# Patient Record
Sex: MALE | Race: OTHER | Employment: OTHER | URBAN - METROPOLITAN AREA
[De-identification: names, ages, dates, MRNs, and addresses within clinical notes are randomized per-mention and may not be internally consistent; named-entity substitution may affect disease eponyms.]

---

## 2020-05-05 ENCOUNTER — HOSPITAL ENCOUNTER (INPATIENT)
Facility: HOSPITAL | Age: 65
LOS: 9 days | Discharge: HOME | DRG: 177 | End: 2020-05-14
Attending: EMERGENCY MEDICINE | Admitting: HOSPITALIST
Payer: MEDICARE

## 2020-05-05 ENCOUNTER — APPOINTMENT (OUTPATIENT)
Dept: GENERAL RADIOLOGY | Facility: HOSPITAL | Age: 65
DRG: 177 | End: 2020-05-05
Attending: EMERGENCY MEDICINE
Payer: MEDICARE

## 2020-05-05 DIAGNOSIS — J96.01 ACUTE RESPIRATORY FAILURE WITH HYPOXIA (HCC): ICD-10-CM

## 2020-05-05 DIAGNOSIS — U07.1 PNEUMONIA DUE TO COVID-19 VIRUS: Primary | ICD-10-CM

## 2020-05-05 DIAGNOSIS — J12.82 PNEUMONIA DUE TO COVID-19 VIRUS: Primary | ICD-10-CM

## 2020-05-05 PROCEDURE — 99223 1ST HOSP IP/OBS HIGH 75: CPT | Performed by: INTERNAL MEDICINE

## 2020-05-05 PROCEDURE — 71045 X-RAY EXAM CHEST 1 VIEW: CPT | Performed by: EMERGENCY MEDICINE

## 2020-05-05 PROCEDURE — 99223 1ST HOSP IP/OBS HIGH 75: CPT | Performed by: HOSPITALIST

## 2020-05-05 RX ORDER — ZINC SULFATE 50(220)MG
220 CAPSULE ORAL DAILY
Status: DISCONTINUED | OUTPATIENT
Start: 2020-05-05 | End: 2020-05-14

## 2020-05-05 RX ORDER — HYDROXYCHLOROQUINE SULFATE 200 MG/1
200 TABLET, FILM COATED ORAL 2 TIMES DAILY
Status: DISCONTINUED | OUTPATIENT
Start: 2020-05-05 | End: 2020-05-06

## 2020-05-05 RX ORDER — DOXYCYCLINE HYCLATE 100 MG/1
100 CAPSULE ORAL EVERY 12 HOURS SCHEDULED
Status: DISCONTINUED | OUTPATIENT
Start: 2020-05-05 | End: 2020-05-06

## 2020-05-05 RX ORDER — SODIUM CHLORIDE 0.9 % (FLUSH) 0.9 %
3 SYRINGE (ML) INJECTION AS NEEDED
Status: DISCONTINUED | OUTPATIENT
Start: 2020-05-05 | End: 2020-05-14

## 2020-05-05 RX ORDER — LEVOFLOXACIN 750 MG/1
750 TABLET ORAL DAILY
Status: ON HOLD | COMMUNITY
End: 2020-05-14

## 2020-05-05 RX ORDER — SODIUM CHLORIDE 9 MG/ML
INJECTION, SOLUTION INTRAVENOUS CONTINUOUS
Status: ACTIVE | OUTPATIENT
Start: 2020-05-05 | End: 2020-05-05

## 2020-05-05 RX ORDER — ASCORBIC ACID 500 MG
500 TABLET ORAL 3 TIMES DAILY
Status: DISCONTINUED | OUTPATIENT
Start: 2020-05-05 | End: 2020-05-14

## 2020-05-05 RX ORDER — FENOFIBRATE 160 MG/1
160 TABLET ORAL DAILY
COMMUNITY

## 2020-05-05 RX ORDER — ACETAMINOPHEN 325 MG/1
650 TABLET ORAL EVERY 6 HOURS PRN
Status: DISCONTINUED | OUTPATIENT
Start: 2020-05-05 | End: 2020-05-14

## 2020-05-05 RX ORDER — HEPARIN SODIUM 5000 [USP'U]/ML
5000 INJECTION, SOLUTION INTRAVENOUS; SUBCUTANEOUS EVERY 12 HOURS SCHEDULED
Status: CANCELLED | OUTPATIENT
Start: 2020-05-05

## 2020-05-05 RX ORDER — ONDANSETRON 2 MG/ML
4 INJECTION INTRAMUSCULAR; INTRAVENOUS EVERY 6 HOURS PRN
Status: DISCONTINUED | OUTPATIENT
Start: 2020-05-05 | End: 2020-05-14

## 2020-05-05 RX ORDER — METHYLPREDNISOLONE SODIUM SUCCINATE 40 MG/ML
40 INJECTION, POWDER, LYOPHILIZED, FOR SOLUTION INTRAMUSCULAR; INTRAVENOUS EVERY 12 HOURS
Status: DISCONTINUED | OUTPATIENT
Start: 2020-05-05 | End: 2020-05-07

## 2020-05-05 RX ORDER — ENOXAPARIN SODIUM 100 MG/ML
40 INJECTION SUBCUTANEOUS EVERY 12 HOURS SCHEDULED
Status: DISCONTINUED | OUTPATIENT
Start: 2020-05-05 | End: 2020-05-09

## 2020-05-05 RX ORDER — ENALAPRIL MALEATE 10 MG/1
10 TABLET ORAL DAILY
COMMUNITY

## 2020-05-05 RX ORDER — DEXTROSE MONOHYDRATE 25 G/50ML
50 INJECTION, SOLUTION INTRAVENOUS
Status: DISCONTINUED | OUTPATIENT
Start: 2020-05-05 | End: 2020-05-14

## 2020-05-05 RX ORDER — ONDANSETRON 2 MG/ML
4 INJECTION INTRAMUSCULAR; INTRAVENOUS ONCE
Status: COMPLETED | OUTPATIENT
Start: 2020-05-05 | End: 2020-05-05

## 2020-05-05 RX ORDER — ACETAMINOPHEN 500 MG
1000 TABLET ORAL ONCE
Status: COMPLETED | OUTPATIENT
Start: 2020-05-05 | End: 2020-05-05

## 2020-05-05 NOTE — ED NOTES
Orders for admission, patient is aware of plan and ready to go upstairs. Any questions, please call ED RN Ck Love  at extension [phone].

## 2020-05-05 NOTE — ED PROVIDER NOTES
Patient Seen in: [facility] Emergency Department      History   Patient presents with:  Nausea/Vomiting/Diarrhea  Cough/URI    Stated Complaint: TL - +C19/Vomiting     HPI    52-year-old male with past medical history significant for diabetes, HIV tenderness  Abdominal: Nontender. Nondistended. Soft. Bowel sounds are normal.   Back:   : Musculoskeletal: Normal range of motion. No deformity. Lymphadenopathy: No sig cervical LAD   Neurological: Awake, alert. Normal reflexes.  No cranial nerve de CBC WITH DIFFERENTIAL WITH PLATELET.   Procedure                               Abnormality         Status                     ---------                               -----------         ------                     CBC W/ DIFFERENTIAL[[id number]]          Abno documenting in patient's chart.   Admission disposition: 5/5/2020  3:35 PM                   Disposition and Plan     Clinical Impression:  Pneumonia due to COVID-19 virus  (primary encounter diagnosis)  Acute respiratory failure with hypoxia ([facility] [address]    Dispo

## 2020-05-05 NOTE — ED NOTES
Presents to ED for c/o worsening dyspnea. States he has been feeling ill for 1 week with headache, vomiting, diarrhea, fever, body aches and chills. Tested + for COVID at [facility] over the weekend.  States he lives by himself and does not work, he has no kno

## 2020-05-05 NOTE — H&P
Orders Placed This Encounter   • Latv[place] ENCEPHALITIS VIRUS VACC INACT IM   • HEP A VACC ADULT, IM   • typhoid (VIVOTIF      STAR)  capsule     Sig: Take 1 capsule by mouth every other day.     Dispense:  4 capsule     Refill:  0   • mefloquine (LARIAM) 250 MG tablet     Sig: Take 1 tablet by mouth every 7 days. Start one week before travel and continue for 4 weeks after     Dispense:  7 tablet     Refill:  0        [facility]    PATIENT'S NAME: Esha Barbara   ATTENDING PHYSICIAN: Letha Patricio MD   PATIENT ACCOUNT#:   [id number]    LOCATION:  [place] [address]  MEDICAL RECORD #:   [id number]       YOB: 1955  ADMISSION DATE:       05/05/202 current illness. He cannot remember if he had any specific sick contacts. Other 12-point review of systems negative. PHYSICAL EXAMINATION:    GENERAL:  Alert. Oriented to time, place, and person. Moderate distress. VITAL SIGNS:  Temperature 102. 4

## 2020-05-05 NOTE — CONSULTS
[facility] HOSP - [facility]    Report of Consultation    Kathy Hernández Patient Status:  Emergency    1955 MRN [id number]   Location [address] Attending Chris Nettles MD   Hosp Day # 0 PCP [address] [facility]     Date of Ad rate 21, height 5' 10\" (1.778 m), weight 170 lb (77.1 kg), SpO2 92 %.     Acutely ill-appearing patient  Patient in acute distress  HEENT : at , nc , anicteric sclera   Neck : supple , no JVD or CYRIL   Chest : bilateral crackles / good air exchange to both Vit c and zinc   ID consult   Lovenox sq for DVT prophylaxis       D/w Dr Robertha Nyhan     Thank you for allowing me to participate in the care of your patient. Jonny Sharpe  5/5/2020

## 2020-05-06 PROCEDURE — 99233 SBSQ HOSP IP/OBS HIGH 50: CPT | Performed by: INTERNAL MEDICINE

## 2020-05-06 PROCEDURE — 99233 SBSQ HOSP IP/OBS HIGH 50: CPT | Performed by: HOSPITALIST

## 2020-05-06 RX ORDER — GUAIFENESIN 100 MG/5ML
200 SOLUTION ORAL EVERY 4 HOURS PRN
Status: DISCONTINUED | OUTPATIENT
Start: 2020-05-06 | End: 2020-05-14

## 2020-05-06 RX ORDER — SULFAMETHOXAZOLE AND TRIMETHOPRIM 800; 160 MG/1; MG/1
1 TABLET ORAL DAILY
Status: DISCONTINUED | OUTPATIENT
Start: 2020-05-07 | End: 2020-05-14

## 2020-05-06 RX ORDER — VANCOMYCIN HYDROCHLORIDE 125 MG/1
125 CAPSULE ORAL DAILY
Status: DISCONTINUED | OUTPATIENT
Start: 2020-05-06 | End: 2020-05-14

## 2020-05-06 NOTE — PLAN OF CARE
Pt states he felt \"horrible\" this morning, having increasing SOB & weakness. O2 increased to 8L throughout day. Pt utilized prone positioning this morning with improvement in O2 sats. O2 sats drop into the 70s when standing at bedside.  No appetite this m Verbalizes/displays adequate comfort level or patient's stated pain goal  Description  INTERVENTIONS:  - Encourage pt to monitor pain and request assistance  - Assess pain using appropriate pain scale  - Administer analgesics based on type and severity of responsible for managing their own health  - Refer to Case Management Department for coordinating discharge planning if the patient needs post-hospital services based on physician/LIP order or complex needs related to functional status, cognitive ability o Respiratory Therapy support as indicated  - Manage/alleviate anxiety  - Monitor for signs/symptoms of CO2 retention  Outcome: Progressing     Problem: GASTROINTESTINAL - ADULT  Goal: Maintains or returns to baseline bowel function  Description  INTERVENTIO intact  Description  INTERVENTIONS  - Assess oral mucosa and hygiene practices  - Implement preventative oral hygiene regimen  - Implement oral medicated treatments as ordered  Outcome: Progressing

## 2020-05-06 NOTE — PROGRESS NOTES
[facility]    Progress Note    Davian Norwood Patient Status:  Inpatient    1955 MRN [id number]   Location [facility] Attending Maryln Denver, [address] Day # 1 PCP JAELYN VALLES       Subjective:   Marcelojayson Norwood is a(n) (PLAQUENIL) tab 200 mg, 200 mg, Oral, BID  Doxycycline Hyclate (VIBRAMYCIN) cap 100 mg, 100 mg, Oral, 2 times per day  Vitamin C tab 500 mg, 500 mg, Oral, TID  zinc sulfate (ZINCATE) cap 220 mg, 220 mg, Oral, Daily  Atazanavir-Cobicistat 300-150 MG TABS 1 Date    HGB 12.0 (L) 05/06/2020    HGB 13.7 05/05/2020      Lab Results   Component Value Date    .0 05/06/2020    .0 05/05/2020       Recent Labs   Lab 05/05/20  1356 05/06/20  0618   * 405*   BUN 29* 36*   CREATSERUM 1.66* 1.29   GFR 25 25 25 35 35 35 35 35 35 35 25 25 25

## 2020-05-06 NOTE — CONSULTS
[place] [facility] - [facility] ID CONSULT NOTE    Lisandro Fam Patient Status:  Inpatient    1955 MRN [id number]   Location [facility] Attending Elissa Fuentes MD   Hosp Day # 0 PCP [address] [facility]       Reason for Consultation:  Se (DEX4) oral liquid 30 g, 30 g, Oral, Q15 Min PRN **OR** Glucose-Vitamin C (DEX-4) chewable tab 8 tablet, 8 tablet, Oral, Q15 Min PRN  •  Insulin Aspart Pen (NOVOLOG) 100 UNIT/ML flexpen 1-11 Units, 1-11 Units, Subcutaneous, TID CC  •  Normal Saline Flush 0 146/64, pulse 111, temperature (!) 101.9 °F (38.8 °C), temperature source Oral, resp. rate 22, height 177.8 cm (5' 10\"), weight 170 lb (77.1 kg), SpO2 91 %. Not examined.     Laboratory Data:  Recent Labs   Lab 05/05/20  1356   RBC 4.60   HGB 13.7   HCT KIEL      PLAN:    -  Start ceftriaxone  -  Continue doxycycline  -  Check BCx  -  Check urine Legionella  -  Continue home HIV meds  -  CD4/CD8 has been ordered although may be unreliable during this acute infection  -  Maintain isolation  -  Follow fever

## 2020-05-06 NOTE — PROGRESS NOTES
[facility]    Progress Note    Dvaian Norwood Patient Status:  Inpatient    1955 MRN [id number]   Location [address] [facility] Attending Maryln Denver, [address] [facility] Day # 1 PCP JAELYN VALLES        Subjective:     Constitutional: close follow up   D/w staff     ID following           Results:     Lab Results   Component Value Date    WBC 8.3 05/06/2020    HGB 12.0 (L) 05/06/2020    HCT 35.0 (L) 05/06/2020    .0 05/06/2020    CREATSERUM 1.29 05/06/2020    BUN 36 (H) 05/06/202

## 2020-05-06 NOTE — PLAN OF CARE
Problem: Patient Centered Care  Goal: Patient preferences are identified and integrated in the patient's plan of care  Description  Interventions:  - What would you like us to know as we care for you?  \"I feel weak\"  - Provide timely, complete, and accu Manage/alleviate anxiety  - Utilize distraction and/or relaxation techniques  - Monitor for opioid side effects  - Notify MD/LIP if interventions unsuccessful or patient reports new pain  - Anticipate increased pain with activity and pre-medicate as approp Monitor vital signs, rhythm, and trends  - Monitor for bleeding, hypotension and signs of decreased cardiac output  - Evaluate effectiveness of vasoactive medications to optimize hemodynamic stability  - Monitor arterial and/or venous puncture sites for bl Obtain nutritional consult as needed  - Establish a toileting routine/schedule  - Consider collaborating with pharmacy to review patient's medication profile  Outcome: Progressing     Problem: GENITOURINARY - ADULT  Goal: Absence of urinary retention  Desc monitor closely.

## 2020-05-06 NOTE — PROGRESS NOTES
[place] [facility] - [facility] ID PROGRESS NOTE    Nikhil Flaherty Patient Status:  Inpatient    1955 MRN [id number]   Location [address] [facility] Attending Yordy Barrera, [address] [facility] Day # 1 PCP JAELYN VALLES     Subjective:  ROS reviewed wi bilateral disease.   Labs with elevated CRP and KIEL.     # Severe COVID-19 pneumonia with hypoxia, bilateral infiltrates               - symptom onset 4/28               - s/p Actemra, steroids 5/5/20               - on Levaquin PTA  # Possible secondary ba

## 2020-05-07 PROCEDURE — 99233 SBSQ HOSP IP/OBS HIGH 50: CPT | Performed by: INTERNAL MEDICINE

## 2020-05-07 PROCEDURE — 99233 SBSQ HOSP IP/OBS HIGH 50: CPT | Performed by: HOSPITALIST

## 2020-05-07 RX ORDER — HYDROCODONE POLISTIREX AND CHLORPHENIRAMINE POLISTIREX 10; 8 MG/5ML; MG/5ML
5 SUSPENSION, EXTENDED RELEASE ORAL 2 TIMES DAILY PRN
Status: DISCONTINUED | OUTPATIENT
Start: 2020-05-07 | End: 2020-05-14

## 2020-05-07 RX ORDER — GUAIFENESIN/DEXTROMETHORPHAN 100-10MG/5
100 SYRUP ORAL EVERY 4 HOURS PRN
Status: DISCONTINUED | OUTPATIENT
Start: 2020-05-07 | End: 2020-05-14

## 2020-05-07 NOTE — PLAN OF CARE
Patient A/Ox4, VSS. Afebrile. Weaned from 8L to 7L via nasal cannula. Saturating in high 80's to low 90's. Prone positioning routinely encouraged. Condom catheter removed midday by patient, requests to keep off.  Voiding in urinal. Saline locked per protoco for opioid side effects  - Notify MD/LIP if interventions unsuccessful or patient reports new pain  - Anticipate increased pain with activity and pre-medicate as appropriate  Outcome: Progressing     Problem: SAFETY ADULT - FALL  Goal: Free from fall injur decreased cardiac output  - Evaluate effectiveness of vasoactive medications to optimize hemodynamic stability  - Monitor arterial and/or venous puncture sites for bleeding and/or hematoma  - Assess quality of pulses, skin color and temperature  - Assess f Consider collaborating with pharmacy to review patient's medication profile  Outcome: Progressing     Problem: GENITOURINARY - ADULT  Goal: Absence of urinary retention  Description  INTERVENTIONS:  - Assess patient’s ability to void and empty bladder  - M

## 2020-05-07 NOTE — PLAN OF CARE
Problem: Patient Centered Care  Goal: Patient preferences are identified and integrated in the patient's plan of care  Description  Interventions:  - What would you like us to know as we care for you?  \"I feel a little better\"  - Provide timely, complet management  - Manage/alleviate anxiety  - Utilize distraction and/or relaxation techniques  - Monitor for opioid side effects  - Notify MD/LIP if interventions unsuccessful or patient reports new pain  - Anticipate increased pain with activity and pre-medi stability  Description  INTERVENTIONS:  - Monitor vital signs, rhythm, and trends  - Monitor for bleeding, hypotension and signs of decreased cardiac output  - Evaluate effectiveness of vasoactive medications to optimize hemodynamic stability  - Monitor ar medications  - Encourage mobilization and activity  - Obtain nutritional consult as needed  - Establish a toileting routine/schedule  - Consider collaborating with pharmacy to review patient's medication profile  Outcome: Progressing     Problem: Sudhakar Earthly

## 2020-05-07 NOTE — PROGRESS NOTES
[facility]    Progress Note    Irving Sullivan Patient Status:  Inpatient    1955 MRN [id number]   Location [facility] Attending Flynn Gazra, [address] Day # 2 PCP JAELYN VALLES       Subjective:   Irving Sullivan is a(n) Flush 0.9 % injection 3 mL, 3 mL, Intravenous, PRN  acetaminophen (TYLENOL) tab 650 mg, 650 mg, Oral, Q6H PRN  ondansetron HCl (ZOFRAN) injection 4 mg, 4 mg, Intravenous, Q6H PRN  Enoxaparin Sodium (LOVENOX) 40 MG/0.4ML injection 40 mg, 40 mg, Subcutaneous 108 108   CO2 21.0 21.0 22.0             Assessment and Plan:         ASSESSMENT:    1.        Acute hypoxemic respiratory failure with Covid 19 pneumonia   - currently on 7 L prone   - s/p actemra   - s/p solumedrol  - cont hydroxychloroquine   - cont ceft

## 2020-05-07 NOTE — PROGRESS NOTES
[facility]    Progress Note    Triston Tom Green Patient Status:  Inpatient    1955 MRN [id number]   Location [address] [place] [address] Attending Eileen Zamarripa, [address] [facility] Day # 2 PCP JAELYN VALLES        Subjective:     Constitutional: secondary infection with mild leukocytosis and elevated procalcitonin  Other HTV meds per ID   O2 therapy  Encourage pt for Therapeutic proning   Supportive care   Vit c and zinc   ID consult   Lovenox sq 40 mg bid  for DVT prophylaxis      Keep close foll

## 2020-05-07 NOTE — DIETARY NOTE
ADULT NUTRITION INITIAL ASSESSMENT    Pt is at moderate nutrition risk. Pt does not meet malnutrition criteria.       RECOMMENDATIONS TO MD:  See Nutrition Intervention     NUTRITION DIAGNOSIS/PROBLEM:  Inadequate oral intake related to physiological cause adequate Kcal/Pro intake along with Oral Nutritional Supplements if needed.     - Coordination of nutrition care: collaboration with other providers  - Discharge and transfer of nutrition care to new setting or provider: Pt from home with family, monitor pl 2 g Intravenous Q24H     LABS: reviewed CRP and Ferritin levels trending down, WBC and D Dimer trending up  Recent Labs     05/05/20  1356 05/06/20  0618 05/07/20  0632   * 405* 225*   BUN 29* 36* 48*   CREATSERUM 1.66* 1.29 1.43*   CA 8.2* 7.8* 8.5

## 2020-05-07 NOTE — PROGRESS NOTES
[place] [facility] - [facility] ID PROGRESS NOTE    Jasedi Winston Patient Status:  Inpatient    1955 MRN [id number]   Location [address] [facility] Attending Agustina Hernández, [address] Day # 2 PCP JAELYN VALLES     Subjective:  ROS reviewed wi Tocilizumab and Solu-Medrol. Chest x-ray bilateral disease.   Labs with elevated CRP and KIEL.     # Severe COVID-19 pneumonia with hypoxia, bilateral infiltrates               - symptom onset 4/28               - s/p Actemra, steroids 5/5/20

## 2020-05-08 ENCOUNTER — APPOINTMENT (OUTPATIENT)
Dept: GENERAL RADIOLOGY | Facility: HOSPITAL | Age: 65
DRG: 177 | End: 2020-05-08
Attending: INTERNAL MEDICINE
Payer: MEDICARE

## 2020-05-08 PROCEDURE — 99233 SBSQ HOSP IP/OBS HIGH 50: CPT | Performed by: INTERNAL MEDICINE

## 2020-05-08 PROCEDURE — 71045 X-RAY EXAM CHEST 1 VIEW: CPT | Performed by: INTERNAL MEDICINE

## 2020-05-08 PROCEDURE — 99233 SBSQ HOSP IP/OBS HIGH 50: CPT | Performed by: HOSPITALIST

## 2020-05-08 RX ORDER — SODIUM CHLORIDE 9 MG/ML
INJECTION, SOLUTION INTRAVENOUS ONCE
Status: DISCONTINUED | OUTPATIENT
Start: 2020-05-08 | End: 2020-05-14

## 2020-05-08 RX ORDER — METHYLPREDNISOLONE SODIUM SUCCINATE 40 MG/ML
20 INJECTION, POWDER, LYOPHILIZED, FOR SOLUTION INTRAMUSCULAR; INTRAVENOUS EVERY 12 HOURS
Status: DISCONTINUED | OUTPATIENT
Start: 2020-05-08 | End: 2020-05-11

## 2020-05-08 NOTE — PLAN OF CARE
Problem: Patient Centered Care  Goal: Patient preferences are identified and integrated in the patient's plan of care  Description  Interventions:  - What would you like us to know as we care for you?   - Provide timely, complete, and accurate informatio anxiety  - Utilize distraction and/or relaxation techniques  - Monitor for opioid side effects  - Notify MD/LIP if interventions unsuccessful or patient reports new pain  - Anticipate increased pain with activity and pre-medicate as appropriate  Outcome: P rhythm, and trends  - Monitor for bleeding, hypotension and signs of decreased cardiac output  - Evaluate effectiveness of vasoactive medications to optimize hemodynamic stability  - Monitor arterial and/or venous puncture sites for bleeding and/or hematom consult as needed  - Establish a toileting routine/schedule  - Consider collaborating with pharmacy to review patient's medication profile  Outcome: Progressing     Problem: GENITOURINARY - ADULT  Goal: Absence of urinary retention  Description  INTERVENTI bathroom with standby assist, call precautions. IV abx maintained. Able to tolerate periods of lying prone, encouraged patient to continue.

## 2020-05-08 NOTE — PROGRESS NOTES
[facility]    Progress Note    Debi Collet Patient Status:  Inpatient    1955 MRN [id number]   Location [address] [facility] Attending Jeremiah Cantor, [address] [facility] Day # 3 PCP JAELYN VALLES        Subjective:     Constitutional: meds per ID   O2 therapy  Encourage pt for Therapeutic proning   Supportive care   Vit c and zinc   D/w ID   Lovenox sq 40 mg bid  for high DVT prophylaxis   Potential candidate for plasma therapy        D/w ID   D/w staff            Results:     Lab Resul

## 2020-05-08 NOTE — PROGRESS NOTES
[place] [facility] - [facility] ID PROGRESS NOTE    Veleta Ada Patient Status:  Inpatient    1955 MRN [id number]   Location [address] [facility] Attending Joni Meehan, [address] [facility] Day # 3 PCP JAELYN VALLES     Subjective:  ROS reviewed wi Tocilizumab and Solu-Medrol. Chest x-ray bilateral disease.   Labs with elevated CRP and KIEL.     # Severe COVID-19 pneumonia with hypoxia, bilateral infiltrates               - symptom onset 4/28               - s/p Actemra, steroids 5/5/20

## 2020-05-08 NOTE — PLAN OF CARE
Problem: Patient Centered Care  Goal: Patient preferences are identified and integrated in the patient's plan of care  Description  Interventions:  - What would you like us to know as we care for you?  I WOULD LIKE TO GO HOME  - Provide timely, complete, Manage/alleviate anxiety  - Utilize distraction and/or relaxation techniques  - Monitor for opioid side effects  - Notify MD/LIP if interventions unsuccessful or patient reports new pain  - Anticipate increased pain with activity and pre-medicate as approp Monitor vital signs, rhythm, and trends  - Monitor for bleeding, hypotension and signs of decreased cardiac output  - Evaluate effectiveness of vasoactive medications to optimize hemodynamic stability  - Monitor arterial and/or venous puncture sites for bl Obtain nutritional consult as needed  - Establish a toileting routine/schedule  - Consider collaborating with pharmacy to review patient's medication profile  Outcome: Progressing     Problem: GENITOURINARY - ADULT  Goal: Absence of urinary retention  Desc BETWEEN 79-82% AND O2 WAS INCREASED. PATIENT WAS RAISED TO 15L/HFNC. PATIENT ASYMPTOMATIC. PATIENT WAS ASSISTED TO LAY PRONE AND DO DEEP BREATHING EXERCISES AND SATURATIONS WERE COMING BACK INTO NORMAL LIMITS.  SLOW PATIENT WAS WEANED DOWN FROM 15L TO 10 WI

## 2020-05-08 NOTE — PROGRESS NOTES
[facility]    Progress Note    Marybel Holden Patient Status:  Inpatient    1955 MRN [id number]   Location [facility] Attending Kevin Mayes, [address] [facility] Day # 3 PCP JAELYN VALLES       Subjective:   Marybel Holden is a(n) 30 g, 30 g, Oral, Q15 Min PRN    Or  Glucose-Vitamin C (DEX-4) chewable tab 8 tablet, 8 tablet, Oral, Q15 Min PRN  Normal Saline Flush 0.9 % injection 3 mL, 3 mL, Intravenous, PRN  acetaminophen (TYLENOL) tab 650 mg, 650 mg, Oral, Q6H PRN  ondansetron HCl (H) 05/07/2020    WBC 8.3 05/06/2020     Lab Results   Component Value Date    HGB 11.6 (L) 05/08/2020    HGB 12.1 (L) 05/07/2020    HGB 12.0 (L) 05/06/2020      Lab Results   Component Value Date    .0 05/08/2020    .0 05/07/2020    .

## 2020-05-09 PROCEDURE — 99232 SBSQ HOSP IP/OBS MODERATE 35: CPT | Performed by: INTERNAL MEDICINE

## 2020-05-09 PROCEDURE — 99232 SBSQ HOSP IP/OBS MODERATE 35: CPT | Performed by: HOSPITALIST

## 2020-05-09 RX ORDER — SODIUM CHLORIDE/ALOE VERA
GEL (GRAM) NASAL AS NEEDED
Status: DISCONTINUED | OUTPATIENT
Start: 2020-05-09 | End: 2020-05-14

## 2020-05-09 RX ORDER — ECHINACEA PURPUREA EXTRACT 125 MG
1 TABLET ORAL
Status: DISCONTINUED | OUTPATIENT
Start: 2020-05-09 | End: 2020-05-14

## 2020-05-09 RX ORDER — HYDRALAZINE HYDROCHLORIDE 20 MG/ML
10 INJECTION INTRAMUSCULAR; INTRAVENOUS EVERY 4 HOURS PRN
Status: DISCONTINUED | OUTPATIENT
Start: 2020-05-09 | End: 2020-05-14

## 2020-05-09 NOTE — PLAN OF CARE
Blood pressure elevated. PRN hydralazine administered. Denies pain. Pt has shortness of breath with exertion. Up to bathroom with standby assist. Epistaxis this afternoon; switched to non-rebreather. Weaning O2 as tolerated. Encouraged prone position.     P assistance  - Assess pain using appropriate pain scale  - Administer analgesics based on type and severity of pain and evaluate response  - Implement non-pharmacological measures as appropriate and evaluate response  - Consider cultural and social influenc services based on physician/LIP order or complex needs related to functional status, cognitive ability or social support system  Outcome: Progressing     Problem: CARDIOVASCULAR - ADULT  Goal: Maintains optimal cardiac output and hemodynamic stability  Ashish GASTROINTESTINAL - ADULT  Goal: Maintains or returns to baseline bowel function  Description  INTERVENTIONS:  - Assess bowel function  - Maintain adequate hydration with IV or PO as ordered and tolerated  - Evaluate effectiveness of GI medications  - Encou medicated treatments as ordered  Outcome: Progressing

## 2020-05-09 NOTE — PLAN OF CARE
Problem: Patient Centered Care  Goal: Patient preferences are identified and integrated in the patient's plan of care  Description  Interventions:  - What would you like us to know as we care for you? From home. Home medications are in the cabinet.     - pain and pain management  - Manage/alleviate anxiety  - Utilize distraction and/or relaxation techniques  - Monitor for opioid side effects  - Notify MD/LIP if interventions unsuccessful or patient reports new pain  - Anticipate increased pain with activit care  - Consider collaborating with pharmacy to review patient's medication profile  - Implement strategies to promote bladder emptying  Outcome: Progressing     Problem: SKIN/TISSUE INTEGRITY - ADULT  Goal: Skin integrity remains intact  Description  INTE

## 2020-05-09 NOTE — PROGRESS NOTES
[facility]     Progress Note        Pilo Figueroa Patient Status:  Inpatient    1955 MRN [id number]   Location [address] [facility] Attending Sumi Perez MD   Hosp Day # 4 PCP MARK Debbie Heimlich       Subjective:   Patient seen and ex liquid 30 g, 30 g, Oral, Q15 Min PRN    Or  Glucose-Vitamin C (DEX-4) chewable tab 8 tablet, 8 tablet, Oral, Q15 Min PRN  Normal Saline Flush 0.9 % injection 3 mL, 3 mL, Intravenous, PRN  acetaminophen (TYLENOL) tab 650 mg, 650 mg, Oral, Q6H PRN  ondansetr 5/08/2020 at 8:28 AM                  Assessment   1. COVID-19  2. Acute hypoxemic respiratory failure  3. HIV  4. Diabetes mellitus       Plan   -Patient presents with evidence of acute hypoxemic respiratory failure secondary to COVID-19 infection.   -

## 2020-05-09 NOTE — PROGRESS NOTES
[place] [facility]    Veleta Ada Patient Status:  Inpatient    1955 MRN [id number]   Location [address] [place] [address] Attending Joni Meehan, [address] [facility] Day # 4 PCP JAELYN VALLES     Subjective:  ROS reviewed wi shortness of breath as well as hypoxia. Placed on 4 L nasal cannula. Fevers up to 102.7. WBC 11.8. Procalcitonin 0.7. UA without pyuria. Lactic acid normal.  Started on ceftriaxone and given Tocilizumab and Solu-Medrol.   Chest x-ray bilateral disease

## 2020-05-09 NOTE — PROGRESS NOTES
[place] [facility] - [facility]  Hospitalist Progress Note     Debi Collet Patient Status:  Inpatient    1955  62year old CSN [id number]   Location 510/510-A Attending Shae Marcos, [address] [facility] Se Day # 4 PCP JAELYN Greenberg Seen     ASSESSMENT/PLAN    Acute CO --   --   --   --    TP 7.9  --   --   --   --     < > = values in this interval not displayed. No results for input(s): PT, INR, PTT in the last 168 hours.     • sodium chloride   Intravenous Once   • MethylPREDNISolone Sodium Succ  20 mg Intravenous Q

## 2020-05-10 PROCEDURE — 99232 SBSQ HOSP IP/OBS MODERATE 35: CPT | Performed by: HOSPITALIST

## 2020-05-10 PROCEDURE — 99232 SBSQ HOSP IP/OBS MODERATE 35: CPT | Performed by: INTERNAL MEDICINE

## 2020-05-10 NOTE — PROGRESS NOTES
[place] [facility] - [facility]  Hospitalist Progress Note     Ariel Barboza Patient Status:  Inpatient    1955  62year old CSN [id number]   Location 510/510-A Attending Ry Magaña,  [facility] Se Day # 5 PCP MARK Angelene Blizzard     ASSESSMENT/PLAN    Acute CO --   --   --    AST 33  --   --   --   --    ALT 16  --   --   --   --    BILT 1.4  --   --   --   --    TP 7.9  --   --   --   --     < > = values in this interval not displayed. No results for input(s): PT, INR, PTT in the last 168 hours.     • sodium

## 2020-05-10 NOTE — PLAN OF CARE
Robitussin administered for cough. Nasal gel and spray ordered for nasal dryness. Weaned off non-rebreather mask overnight and currently remains on 8L HFNC with saturations ranging 88-91%. Con't IV solumedrol and IV rocephin.  Plan to wean off oxygen as mitesh to monitor pain and request assistance  - Assess pain using appropriate pain scale  - Administer analgesics based on type and severity of pain and evaluate response  - Implement non-pharmacological measures as appropriate and evaluate response  - Consider patient needs post-hospital services based on physician/LIP order or complex needs related to functional status, cognitive ability or social support system  Outcome: Progressing     Problem: CARDIOVASCULAR - ADULT  Goal: Maintains optimal cardiac output an Progressing     Problem: GASTROINTESTINAL - ADULT  Goal: Maintains or returns to baseline bowel function  Description  INTERVENTIONS:  - Assess bowel function  - Maintain adequate hydration with IV or PO as ordered and tolerated  - Evaluate effectiveness o regimen  - Implement oral medicated treatments as ordered  Outcome: Progressing

## 2020-05-10 NOTE — PROGRESS NOTES
[facility]     Progress Note        Debi Collet Patient Status:  Inpatient    1955 MRN [id number]   Location [address] [facility] within [facility] Attending Shae Marcos MD   Hosp Day # 5 PCP JAELYN Greenberg Seen       Subjective:   Patient seen and ex tablet, 4 tablet, Oral, Q15 Min PRN    Or  dextrose 50 % injection 50 mL, 50 mL, Intravenous, Q15 Min PRN    Or  glucose (DEX4) oral liquid 30 g, 30 g, Oral, Q15 Min PRN    Or  Glucose-Vitamin C (DEX-4) chewable tab 8 tablet, 8 tablet, Oral, Q15 Min PRN  N Clinic

## 2020-05-10 NOTE — PLAN OF CARE
Vitals stable on 5L HFNC. Pt received convalescent plasma this morning. Denies pain. Appetite improving. Activity tolerance improving.    Problem: Patient Centered Care  Goal: Patient preferences are identified and integrated in the patient's plan of care response  - Implement non-pharmacological measures as appropriate and evaluate response  - Consider cultural and social influences on pain and pain management  - Manage/alleviate anxiety  - Utilize distraction and/or relaxation techniques  - Monitor for op system  Outcome: Progressing     Problem: CARDIOVASCULAR - ADULT  Goal: Maintains optimal cardiac output and hemodynamic stability  Description  INTERVENTIONS:  - Monitor vital signs, rhythm, and trends  - Monitor for bleeding, hypotension and signs of dec function  - Maintain adequate hydration with IV or PO as ordered and tolerated  - Evaluate effectiveness of GI medications  - Encourage mobilization and activity  - Obtain nutritional consult as needed  - Establish a toileting routine/schedule  - Consider

## 2020-05-11 PROCEDURE — 99232 SBSQ HOSP IP/OBS MODERATE 35: CPT | Performed by: INTERNAL MEDICINE

## 2020-05-11 PROCEDURE — 99232 SBSQ HOSP IP/OBS MODERATE 35: CPT | Performed by: HOSPITALIST

## 2020-05-11 RX ORDER — ENOXAPARIN SODIUM 100 MG/ML
40 INJECTION SUBCUTANEOUS EVERY 12 HOURS SCHEDULED
Status: DISCONTINUED | OUTPATIENT
Start: 2020-05-12 | End: 2020-05-14

## 2020-05-11 RX ORDER — ENOXAPARIN SODIUM 100 MG/ML
40 INJECTION SUBCUTANEOUS DAILY
Status: DISCONTINUED | OUTPATIENT
Start: 2020-05-11 | End: 2020-05-11

## 2020-05-11 RX ORDER — METHYLPREDNISOLONE SODIUM SUCCINATE 40 MG/ML
20 INJECTION, POWDER, LYOPHILIZED, FOR SOLUTION INTRAMUSCULAR; INTRAVENOUS DAILY
Status: DISCONTINUED | OUTPATIENT
Start: 2020-05-12 | End: 2020-05-12

## 2020-05-11 NOTE — PROGRESS NOTES
[facility]    Progress Note    Jalen Munoz Patient Status:  Inpatient    1955 MRN [id number]   Location 12[address] [facility] Attending Mame Rosario MD   Hosp Day # 6 PCP JAELYN VALLES        Subjective:     Constitutional: Neg Component Value Date    WBC 10.6 05/09/2020    HGB 11.7 (L) 05/09/2020    HCT 34.0 (L) 05/09/2020    .0 05/09/2020    CREATSERUM 1.02 05/09/2020    BUN 29 (H) 05/09/2020     05/09/2020    K 4.8 05/09/2020     05/09/2020    CO2 25.0 05/

## 2020-05-11 NOTE — PLAN OF CARE
A/O, FEELING BETTER, CONTINUE 3 L NC, WILL CONTINUE TO MONITOR  Problem: Patient Centered Care  Goal: Patient preferences are identified and integrated in the patient's plan of care  Description  Interventions:  - What would you like us to know as we care evaluate response  - Consider cultural and social influences on pain and pain management  - Manage/alleviate anxiety  - Utilize distraction and/or relaxation techniques  - Monitor for opioid side effects  - Notify MD/LIP if interventions unsuccessful or pa Maintains optimal cardiac output and hemodynamic stability  Description  INTERVENTIONS:  - Monitor vital signs, rhythm, and trends  - Monitor for bleeding, hypotension and signs of decreased cardiac output  - Evaluate effectiveness of vasoactive medication tolerated  - Evaluate effectiveness of GI medications  - Encourage mobilization and activity  - Obtain nutritional consult as needed  - Establish a toileting routine/schedule  - Consider collaborating with pharmacy to review patient's medication profile  O

## 2020-05-11 NOTE — CM/SW NOTE
MDO for dc planning. PT/OT eval pending. Currently on 3 LPM high flow 02. Lives at home alone. Will await therapy rec to determine dc needs. / to remain available for support and/or discharge planning.      Babak Ortega,

## 2020-05-11 NOTE — PROGRESS NOTES
[place] [facility]    Ai Mejia Patient Status:  Inpatient    1955 MRN [id number]   Location 12[address] [facility] Attending Mayelin Glasgow, [address] [facility] Day # 6 PCP JAELYN VALLES     Subjective:  ROS reviewed wi tachypnea and shortness of breath as well as hypoxia. Placed on 4 L nasal cannula. Fevers up to 102.7. WBC 11.8. Procalcitonin 0.7. UA without pyuria. Lactic acid normal.  Started on ceftriaxone and given Tocilizumab and Solu-Medrol.   Chest x-ray goldy

## 2020-05-11 NOTE — PLAN OF CARE
Nasal spray and gel administered for nasal dryness. Weaned down to 4L HFNC with oxygen saturations > 90%. Con't IV solumedrol and IV rocephin. Plan to wean off oxygen as tolerated and to be discharged home when medically cleared.      Problem: Patient Hector using appropriate pain scale  - Administer analgesics based on type and severity of pain and evaluate response  - Implement non-pharmacological measures as appropriate and evaluate response  - Consider cultural and social influences on pain and pain manage physician/LIP order or complex needs related to functional status, cognitive ability or social support system  Outcome: Progressing     Problem: CARDIOVASCULAR - ADULT  Goal: Maintains optimal cardiac output and hemodynamic stability  Description  INTERVEN ADULT  Goal: Maintains or returns to baseline bowel function  Description  INTERVENTIONS:  - Assess bowel function  - Maintain adequate hydration with IV or PO as ordered and tolerated  - Evaluate effectiveness of GI medications  - Encourage mobilization a ordered  Outcome: Progressing

## 2020-05-11 NOTE — PROGRESS NOTES
[place] [facility] - [facility]  Hospitalist Progress Note     Janellalfredo Williamson Patient Status:  Inpatient    1955  62year old CSN [id number]   Location 510/510-A Attending Patricio Arriaga,  [facility] Se Day # 6 PCP JAELYN VALLES     ASSESSMENT/PLAN    Acute CO hours.    • sodium chloride   Intravenous Once   • MethylPREDNISolone Sodium Succ  20 mg Intravenous Q12H   • Insulin Aspart Pen  5 Units Subcutaneous TID CC   • vancomycin HCl  125 mg Oral Daily   • Insulin Aspart Pen  1-11 Units Subcutaneous TID CC and H

## 2020-05-12 PROCEDURE — 99232 SBSQ HOSP IP/OBS MODERATE 35: CPT | Performed by: INTERNAL MEDICINE

## 2020-05-12 PROCEDURE — 99232 SBSQ HOSP IP/OBS MODERATE 35: CPT | Performed by: HOSPITALIST

## 2020-05-12 NOTE — OCCUPATIONAL THERAPY NOTE
OCCUPATIONAL THERAPY EVALUATION - INPATIENT     Room Number: 510/510-A  Evaluation Date: 5/12/2020  Type of Evaluation: Initial  Presenting Problem: +COVID-19    Physician Order: IP Consult to Occupational Therapy  Reason for Therapy: ADL/IADL Dysfunctio will progress towards IP therapy goals and demo the skills required to return home with intermittent family assist -especially for IADLs (meal prep, cleaning).      The patient's Approx Degree of Impairment: 32.79% has been calculated based on documentation patient currently need…  -   Putting on and taking off regular lower body clothing?: A Little  -   Bathing (including washing, rinsing, drying)?: A Little  -   Toileting, which includes using toilet, bedpan or urinal? : A Little  -   Putting on and taking

## 2020-05-12 NOTE — PLAN OF CARE
Problem: Patient Centered Care  Goal: Patient preferences are identified and integrated in the patient's plan of care  Description  Interventions:  - What would you like us to know as we care for you? From home. Home medications are in the cabinet.     - pain and pain management  - Manage/alleviate anxiety  - Utilize distraction and/or relaxation techniques  - Monitor for opioid side effects  - Notify MD/LIP if interventions unsuccessful or patient reports new pain  - Anticipate increased pain with activit stability  Description  INTERVENTIONS:  - Monitor vital signs, rhythm, and trends  - Monitor for bleeding, hypotension and signs of decreased cardiac output  - Evaluate effectiveness of vasoactive medications to optimize hemodynamic stability  - Monitor ar medications  - Encourage mobilization and activity  - Obtain nutritional consult as needed  - Establish a toileting routine/schedule  - Consider collaborating with pharmacy to review patient's medication profile  Outcome: Progressing     Problem: Eligio Moctezuma

## 2020-05-12 NOTE — PHYSICAL THERAPY NOTE
PHYSICAL THERAPY EVALUATION - INPATIENT     Room Number: 510/510-A  Evaluation Date: 5/12/2020  Type of Evaluation: Initial   Physician Order: PT Eval and Treat    Presenting Problem: +COVID-19  Reason for Therapy: Mobility Dysfunction and Discharge Plann shortness of breath. CBC showed white blood cell count of 11.8 with left shift. Chemistry showed GFR of 43, BUN of 29, creatinine 1.66, glucose 218, . C-reactive protein is 13. D-dimer 1.85. Chest x-ray showed extensive bilateral infiltrates. standing up from a chair with arms (e.g., wheelchair, bedside commode, etc.): A Little   -   Moving from lying on back to sitting on the side of the bed?: A Little   How much help from another person does the patient currently need. ..   -   Moving to and f

## 2020-05-12 NOTE — PROGRESS NOTES
[place] [facility] - [facility]  Hospitalist Progress Note     Piper Perez Patient Status:  Inpatient    1955  62year old CSN [id number]   Location 510/510-A Attending Rigo Sales,  [facility] Se Day # 7 PCP JAELYN VALLES     ASSESSMENT/PLAN    Acute CO in the last 168 hours.     • enoxaparin  40 mg Subcutaneous 2 times per day   • sodium chloride   Intravenous Once   • Insulin Aspart Pen  5 Units Subcutaneous TID CC   • vancomycin HCl  125 mg Oral Daily   • Insulin Aspart Pen  1-11 Units Subcutaneous TID

## 2020-05-13 PROCEDURE — 99233 SBSQ HOSP IP/OBS HIGH 50: CPT | Performed by: HOSPITALIST

## 2020-05-13 NOTE — DIETARY NOTE
ADULT NUTRITION REASSESSMENT    Pt is at moderate nutrition risk. Pt does not meet malnutrition criteria.       RECOMMENDATIONS TO MD:  See Nutrition Intervention     NUTRITION DIAGNOSIS/PROBLEM:  Inadequate oral intake related to physiological causes incr education: Discussed importance and benefits of healthy eating with adequate Kcal/Pro intake along with Oral Nutritional Supplements if needed.   - Coordination of nutrition care: collaboration with other providers  - Discharge and transfer of nutrition car Daily   • Vitamin C  500 mg Oral TID   • zinc sulfate  220 mg Oral Daily   • Atazanavir-Cobicistat  1 tablet Oral Daily   • Emtricitabine-Tenofovir AF  1 tablet Oral Daily     LABS: reviewed     NUTRITION RELATED PHYSICAL FINDINGS:  - Body Fat/Muscle Mass:

## 2020-05-13 NOTE — PROGRESS NOTES
[place] [facility] - [facility] PROGRESS NOTE    Jalen Munoz Patient Status:  Inpatient    1955 MRN [id number]   Location 12[address] [facility] Attending Ricardo Fitzpatrick, [address] [facility] [address] Day # 8 PCP JAELYN VALLES     Subjective:  ROS reviewed wi Solu-Medrol. Chest x-ray bilateral disease.   Labs with elevated CRP and KIEL.     # Severe COVID-19 pneumonia with hypoxia, bilateral infiltrates               - symptom onset 4/28               - s/p Actemra, steroids 5/5/20               - on Levaquin PT

## 2020-05-13 NOTE — PROGRESS NOTES
[place] [facility] - [facility]  Hospitalist Progress Note     Dexter Lyman Patient Status:  Inpatient    1955  62year old CSN [id number]   Location 510/510-A Attending Mikhail Myles, [address] [facility] Se Day # 8 PCP JAELYN VALLES     ASSESSMENT/PLAN    Acute CO INR, PTT in the last 168 hours.     • Insulin Aspart Pen  7 Units Subcutaneous TID CC   • insulin detemir  26 Units Subcutaneous Nightly   • enoxaparin  40 mg Subcutaneous 2 times per day   • sodium chloride   Intravenous Once   • vancomycin HCl  125 mg Ora

## 2020-05-13 NOTE — PLAN OF CARE
HS . Novolog and levemir given as ordered. No c/o sob. Weaned off O2. Will continue to monitor O2 sat while on room air. Advised pt to call if sob. Tolerating increased activity w/o sob. Continue IV antibiotics. Will continue to monitor BS.  Plan to d additional Care Plan goals for specific interventions    Outcome: Progressing     Problem: PAIN - ADULT  Goal: Verbalizes/displays adequate comfort level or patient's stated pain goal  Description  INTERVENTIONS:  - Encourage pt to monitor pain and request patient/family/discharge partner  - Complete POLST form as appropriate  - Assess patient's ability to be responsible for managing their own health  - Refer to Case Management Department for coordinating discharge planning if the patient needs post-hospital suctioning and perform as needed  - Assess and instruct to report SOB or any respiratory difficulty  - Respiratory Therapy support as indicated  - Manage/alleviate anxiety  - Monitor for signs/symptoms of CO2 retention  Outcome: Progressing     Problem: GA skin care algorithm/standards of care as needed  Outcome: Progressing  Goal: Oral mucous membranes remain intact  Description  INTERVENTIONS  - Assess oral mucosa and hygiene practices  - Implement preventative oral hygiene regimen  - Implement oral medica

## 2020-05-13 NOTE — PLAN OF CARE
Problem: Patient Centered Care  Goal: Patient preferences are identified and integrated in the patient's plan of care  Description  Interventions:  - What would you like us to know as we care for you? From home. Home medications are in the cabinet.     - appropriate pain scale  - Administer analgesics based on type and severity of pain and evaluate response  - Implement non-pharmacological measures as appropriate and evaluate response  - Consider cultural and social influences on pain and pain management order or complex needs related to functional status, cognitive ability or social support system  Outcome: Progressing     Problem: CARDIOVASCULAR - ADULT  Goal: Maintains optimal cardiac output and hemodynamic stability  Description  INTERVENTIONS:  - Alecia Maintains or returns to baseline bowel function  Description  INTERVENTIONS:  - Assess bowel function  - Maintain adequate hydration with IV or PO as ordered and tolerated  - Evaluate effectiveness of GI medications  - Encourage mobilization and activity ordered  Outcome: Progressing     No complaints, possible discharge tomorrow. Frequent rounds, call light in reach.

## 2020-05-14 VITALS
WEIGHT: 160.38 LBS | HEART RATE: 85 BPM | HEIGHT: 70 IN | BODY MASS INDEX: 22.96 KG/M2 | DIASTOLIC BLOOD PRESSURE: 78 MMHG | TEMPERATURE: 98 F | OXYGEN SATURATION: 99 % | SYSTOLIC BLOOD PRESSURE: 130 MMHG | RESPIRATION RATE: 16 BRPM

## 2020-05-14 PROCEDURE — 99239 HOSP IP/OBS DSCHRG MGMT >30: CPT | Performed by: HOSPITALIST

## 2020-05-14 NOTE — CM/SW NOTE
10: 37AM   Pt was discussed during DC rounds. RN states pt would be ready for DC today and would benefit from [facility]. SW called pt's room and discussed DC Plan. SW confirmed pt's address and phone number with the pt. Pt lives in a 1 level  house alone.  There i

## 2020-05-14 NOTE — PLAN OF CARE
Patient was provided with discharge instructions, education, and follow up information. Patient's long acting insulin dose was changed.  Patient verbalizes understanding of follow up information, specifically medication change, follow up appointment (teleph Care Plan goals for specific interventions    Outcome: Adequate for Discharge  Goal: Patient/Family Short Term Goal  Description  Patient's Short Term Goal: \"breathe better\"    Interventions:   - Administer medications as ordered  - wean off O2 as tolera discharge w/pt and caregiver  - Include patient/family/discharge partner in discharge planning  - Arrange for needed discharge resources and transportation as appropriate  - Identify discharge learning needs (meds, wound care, etc)  - Arrange for interpret in respiratory status  - Assess for changes in mentation and behavior  - Position to facilitate oxygenation and minimize respiratory effort  - Oxygen supplementation based on oxygen saturation or ABGs  - Provide Smoking Cessation handout, if applicable  - as ordered  - Instruct patient on fluid and nutrition restrictions as appropriate  Outcome: Adequate for Discharge     Problem: SKIN/TISSUE INTEGRITY - ADULT  Goal: Skin integrity remains intact  Description  INTERVENTIONS  - Assess and document risk facto

## 2020-05-14 NOTE — PLAN OF CARE
Patient waiting to go home, Dianne Julio arranged. Problem: Patient Centered Care  Goal: Patient preferences are identified and integrated in the patient's plan of care  Description  Interventions:  - What would you like us to know as we care for you? From home.  H request assistance  - Assess pain using appropriate pain scale  - Administer analgesics based on type and severity of pain and evaluate response  - Implement non-pharmacological measures as appropriate and evaluate response  - Consider cultural and social post-hospital services based on physician/LIP order or complex needs related to functional status, cognitive ability or social support system  Outcome: Progressing     Problem: CARDIOVASCULAR - ADULT  Goal: Maintains optimal cardiac output and hemodynamic Problem: GASTROINTESTINAL - ADULT  Goal: Maintains or returns to baseline bowel function  Description  INTERVENTIONS:  - Assess bowel function  - Maintain adequate hydration with IV or PO as ordered and tolerated  - Evaluate effectiveness of GI medicatio oral medicated treatments as ordered  Outcome: Progressing

## 2020-05-14 NOTE — PLAN OF CARE
Pt has been cleared to go home. Home health will call patient later today or tomorrow to make arrangements for visits  Pt.  On room air, 94-95 %  Problem: Patient Centered Care  Goal: Patient preferences are identified and integrated in the patient's plan Goal  Description  Patient's Short Term Goal: \"breathe better\"    Interventions:   - Administer medications as ordered  - wean off O2 as tolerated  - Increase activity as tolerated  - See additional Care Plan goals for specific interventions    5/14/2020 RN  Outcome: Progressing     Problem: DISCHARGE PLANNING  Goal: Discharge to home or other facility with appropriate resources  Description  INTERVENTIONS:  - Identify barriers to discharge w/pt and caregiver  - Include patient/family/discharge partner in effectiveness of antiarrhythmic and heart rate control medications as ordered  - Initiate emergency measures for life threatening arrhythmias  - Monitor electrolytes and administer replacement therapy as ordered  5/14/2020 1352 by Shazia Leija, [address] [facility] intake/output and perform bladder scan as needed  - Follow urinary retention protocol/standard of care  - Consider collaborating with pharmacy to review patient's medication profile  - Implement strategies to promote bladder emptying  5/14/2020 1352 by Amina Pham

## 2020-05-14 NOTE — PLAN OF CARE
Problem: Patient Centered Care  Goal: Patient preferences are identified and integrated in the patient's plan of care  Description  Interventions:  - What would you like us to know as we care for you? From home. Home medications are in the cabinet.     - patient/family/discharge partner in discharge planning  - Arrange for needed discharge resources and transportation as appropriate  - Identify discharge learning needs (meds, wound care, etc)  - Arrange for interpreters to assist at discharge as needed  -

## 2020-05-15 ENCOUNTER — PATIENT OUTREACH (OUTPATIENT)
Dept: CASE MANAGEMENT | Age: 65
End: 2020-05-15

## 2020-05-15 ENCOUNTER — TELEPHONE (OUTPATIENT)
Dept: MEDSURG UNIT | Facility: HOSPITAL | Age: 65
End: 2020-05-15

## 2020-05-16 ENCOUNTER — TELEPHONE (OUTPATIENT)
Dept: MEDSURG UNIT | Facility: HOSPITAL | Age: 65
End: 2020-05-16

## 2020-05-17 ENCOUNTER — TELEPHONE (OUTPATIENT)
Dept: MEDSURG UNIT | Facility: HOSPITAL | Age: 65
End: 2020-05-17

## 2020-05-17 NOTE — DISCHARGE SUMMARY
[facility] HOSPITALIST  DISCHARGE SUMMARY     Nathaly Talbert Patient Status:  Inpatient    1955 MRN [id number]   Location [address] [place] [address] Attending No att. providers found   [facility] Day # 9 PCP [address] [facility]     Date of Admission: 2020  Date of on medications, Bactrim prophylaxis also continued.    Diabetes, on insulin and metformin at home, per pt usually reasonably controlled with blood sugars around 140–180 at home, although A1c 9.7  -->pt will continue close monitoring her sugars at home, diet rhonchi. Cardiovascular: S1, S2. Regular rate and rhythm. No murmurs, rubs or gallops. Abdomen: Soft, nontender, nondistended. Positive bowel sounds. No rebound or guarding. Neurologic: No focal neurological deficits.    Musculoskeletal: Moves all extr

## 2020-05-21 ENCOUNTER — HOSPITAL ENCOUNTER (EMERGENCY)
Facility: HOSPITAL | Age: 65
Discharge: HOME | End: 2020-05-21
Attending: EMERGENCY MEDICINE
Payer: MEDICARE

## 2020-05-21 VITALS
WEIGHT: 165 LBS | SYSTOLIC BLOOD PRESSURE: 129 MMHG | HEIGHT: 70 IN | RESPIRATION RATE: 18 BRPM | DIASTOLIC BLOOD PRESSURE: 84 MMHG | TEMPERATURE: 98 F | HEART RATE: 98 BPM | OXYGEN SATURATION: 98 % | BODY MASS INDEX: 23.62 KG/M2

## 2020-05-21 DIAGNOSIS — K61.0 PERIANAL ABSCESS: Primary | ICD-10-CM

## 2020-05-21 PROCEDURE — 82962 GLUCOSE BLOOD TEST: CPT

## 2020-05-21 PROCEDURE — 46050 I&D PERIANAL ABSCESS SUPFC: CPT

## 2020-05-21 PROCEDURE — 99283 EMERGENCY DEPT VISIT LOW MDM: CPT

## 2020-05-21 RX ORDER — SULFAMETHOXAZOLE AND TRIMETHOPRIM 800; 160 MG/1; MG/1
1 TABLET ORAL 2 TIMES DAILY
Qty: 14 TABLET | Refills: 0 | Status: ON HOLD | OUTPATIENT
Start: 2020-05-21 | End: 2020-05-29

## 2020-05-21 NOTE — ED PROVIDER NOTES
Patient Seen in: [facility] AND [facility] Emergency Department      History   Patient presents with:  Abscess    Stated Complaint:     HPI    Patient is a 64-year-old male who presents with rectal abscess x1 week.   He was discharged from the hospital last week discharge. Loculations dissected. No packing placed. D/w patient that abscess may have already drained at home as drainage noted in area. Will start bactrim. advised to return for fevers or worsening symptoms.                Disposition and Plan     Clinical

## 2020-05-21 NOTE — ED NOTES
Assumed care of Leander upon arrival in room 46 via triage. He is A&Ox4, dc'd from this hospital 1 week ago after approx 10 day stay for Covid-19. He reports rectal abscess that developed soon after discharge and has gotten progressively worse.  He states bl

## 2020-05-22 ENCOUNTER — VIRTUAL PHONE E/M (OUTPATIENT)
Dept: CARDIOLOGY CLINIC | Facility: HOSPITAL | Age: 65
End: 2020-05-22
Attending: NURSE PRACTITIONER
Payer: MEDICARE

## 2020-05-22 DIAGNOSIS — J12.82 PNEUMONIA DUE TO COVID-19 VIRUS: Primary | ICD-10-CM

## 2020-05-22 DIAGNOSIS — U07.1 PNEUMONIA DUE TO COVID-19 VIRUS: Primary | ICD-10-CM

## 2020-05-22 PROCEDURE — 99443 PR TELEPHONE EVAL AND MGNT EST PATIENT 21-30 MIN MEDICAL DISCUSSION: CPT | Performed by: NURSE PRACTITIONER

## 2020-05-22 NOTE — PROGRESS NOTES
Virtual Telephone Check-In    Marybel Holden verbally consents to a Virtual/Telephone Check-In visit on 05/22/20. Patient has been referred to the [facility] website at [email or web address] to review the yearly Consent to Treat document.     Patient Darian Casper appropriate     Stay hydrated drinking 64-96 oz per day     Wash your hands frequently and cover your nose/mouth with coughing or sneezing.      Call with increased cough or fatigue, or with fever, chills, sob, weakness, nausea, vomiting or diarrhea     Fol

## 2020-05-22 NOTE — PATIENT INSTRUCTIONS
Continue current medications     Use your incentive spirometer 4 sets of 10 daily    Have influenza vaccine if appropriate     Stay hydrated drinking 64-96 oz per day     Wash your hands frequently and cover your nose/mouth with coughing or sneezing.      C

## 2020-05-22 NOTE — PROGRESS NOTES
Telephone visit. Patient consents to phone visit. Hospital follow up phone call in lieu of [facility] visit due to [address] pandemic. PHM:       Subjective:  Feeling a lot better since discharge. Gets tired with cleaning house or walking.  Lays down for about 3

## 2020-05-24 ENCOUNTER — HOSPITAL ENCOUNTER (INPATIENT)
Facility: HOSPITAL | Age: 65
LOS: 4 days | Discharge: HOME | DRG: 393 | End: 2020-05-29
Attending: EMERGENCY MEDICINE | Admitting: INTERNAL MEDICINE
Payer: MEDICARE

## 2020-05-24 DIAGNOSIS — K61.1 PERIRECTAL CELLULITIS: Primary | ICD-10-CM

## 2020-05-24 DIAGNOSIS — B20 HIV INFECTION, UNSPECIFIED SYMPTOM STATUS (HCC): ICD-10-CM

## 2020-05-24 DIAGNOSIS — Z78.9 FAILURE OF OUTPATIENT TREATMENT: ICD-10-CM

## 2020-05-24 PROCEDURE — 96367 TX/PROPH/DG ADDL SEQ IV INF: CPT

## 2020-05-24 PROCEDURE — 96375 TX/PRO/DX INJ NEW DRUG ADDON: CPT

## 2020-05-24 PROCEDURE — 99285 EMERGENCY DEPT VISIT HI MDM: CPT

## 2020-05-24 PROCEDURE — 85025 COMPLETE CBC W/AUTO DIFF WBC: CPT | Performed by: EMERGENCY MEDICINE

## 2020-05-24 PROCEDURE — 96376 TX/PRO/DX INJ SAME DRUG ADON: CPT

## 2020-05-24 PROCEDURE — 80048 BASIC METABOLIC PNL TOTAL CA: CPT | Performed by: EMERGENCY MEDICINE

## 2020-05-24 PROCEDURE — 96365 THER/PROPH/DIAG IV INF INIT: CPT

## 2020-05-24 PROCEDURE — 82962 GLUCOSE BLOOD TEST: CPT

## 2020-05-24 PROCEDURE — 96366 THER/PROPH/DIAG IV INF ADDON: CPT

## 2020-05-24 RX ORDER — VANCOMYCIN 2 GRAM/500 ML IN 0.9 % SODIUM CHLORIDE INTRAVENOUS
25 ONCE
Status: COMPLETED | OUTPATIENT
Start: 2020-05-24 | End: 2020-05-25

## 2020-05-24 RX ORDER — MORPHINE SULFATE 4 MG/ML
2 INJECTION, SOLUTION INTRAMUSCULAR; INTRAVENOUS ONCE
Status: COMPLETED | OUTPATIENT
Start: 2020-05-24 | End: 2020-05-24

## 2020-05-25 ENCOUNTER — APPOINTMENT (OUTPATIENT)
Dept: CT IMAGING | Facility: HOSPITAL | Age: 65
DRG: 393 | End: 2020-05-25
Attending: EMERGENCY MEDICINE
Payer: MEDICARE

## 2020-05-25 PROBLEM — B20 HIV INFECTION, UNSPECIFIED SYMPTOM STATUS (HCC): Status: ACTIVE | Noted: 2020-05-25

## 2020-05-25 PROBLEM — K61.1 PERIRECTAL CELLULITIS: Status: ACTIVE | Noted: 2020-05-25

## 2020-05-25 PROBLEM — Z78.9 FAILURE OF OUTPATIENT TREATMENT: Status: ACTIVE | Noted: 2020-05-25

## 2020-05-25 PROCEDURE — 72193 CT PELVIS W/DYE: CPT | Performed by: EMERGENCY MEDICINE

## 2020-05-25 PROCEDURE — 87205 SMEAR GRAM STAIN: CPT | Performed by: EMERGENCY MEDICINE

## 2020-05-25 PROCEDURE — 87070 CULTURE OTHR SPECIMN AEROBIC: CPT | Performed by: EMERGENCY MEDICINE

## 2020-05-25 PROCEDURE — 93005 ELECTROCARDIOGRAM TRACING: CPT

## 2020-05-25 PROCEDURE — 87077 CULTURE AEROBIC IDENTIFY: CPT | Performed by: EMERGENCY MEDICINE

## 2020-05-25 PROCEDURE — 82962 GLUCOSE BLOOD TEST: CPT

## 2020-05-25 PROCEDURE — 86360 T CELL ABSOLUTE COUNT/RATIO: CPT | Performed by: EMERGENCY MEDICINE

## 2020-05-25 PROCEDURE — 93010 ELECTROCARDIOGRAM REPORT: CPT | Performed by: INTERNAL MEDICINE

## 2020-05-25 PROCEDURE — 84484 ASSAY OF TROPONIN QUANT: CPT | Performed by: INTERNAL MEDICINE

## 2020-05-25 RX ORDER — SENNA AND DOCUSATE SODIUM 50; 8.6 MG/1; MG/1
1 TABLET, FILM COATED ORAL DAILY
Status: DISCONTINUED | OUTPATIENT
Start: 2020-05-25 | End: 2020-05-29

## 2020-05-25 RX ORDER — DEXTROSE MONOHYDRATE 25 G/50ML
50 INJECTION, SOLUTION INTRAVENOUS
Status: DISCONTINUED | OUTPATIENT
Start: 2020-05-25 | End: 2020-05-29

## 2020-05-25 RX ORDER — MORPHINE SULFATE 4 MG/ML
4 INJECTION, SOLUTION INTRAMUSCULAR; INTRAVENOUS EVERY 2 HOUR PRN
Status: DISCONTINUED | OUTPATIENT
Start: 2020-05-25 | End: 2020-05-29

## 2020-05-25 RX ORDER — KETOROLAC TROMETHAMINE 15 MG/ML
15 INJECTION, SOLUTION INTRAMUSCULAR; INTRAVENOUS EVERY 6 HOURS
Status: DISCONTINUED | OUTPATIENT
Start: 2020-05-25 | End: 2020-05-29

## 2020-05-25 RX ORDER — ONDANSETRON 2 MG/ML
4 INJECTION INTRAMUSCULAR; INTRAVENOUS EVERY 6 HOURS PRN
Status: DISCONTINUED | OUTPATIENT
Start: 2020-05-25 | End: 2020-05-29

## 2020-05-25 RX ORDER — MORPHINE SULFATE 2 MG/ML
2 INJECTION, SOLUTION INTRAMUSCULAR; INTRAVENOUS EVERY 2 HOUR PRN
Status: DISCONTINUED | OUTPATIENT
Start: 2020-05-25 | End: 2020-05-29

## 2020-05-25 RX ORDER — ONDANSETRON 4 MG/1
4 TABLET, ORALLY DISINTEGRATING ORAL EVERY 6 HOURS PRN
Status: DISCONTINUED | OUTPATIENT
Start: 2020-05-25 | End: 2020-05-29

## 2020-05-25 RX ORDER — MORPHINE SULFATE 4 MG/ML
4 INJECTION, SOLUTION INTRAMUSCULAR; INTRAVENOUS EVERY 30 MIN PRN
Status: ACTIVE | OUTPATIENT
Start: 2020-05-25 | End: 2020-05-25

## 2020-05-25 RX ORDER — SODIUM PHOSPHATE, DIBASIC AND SODIUM PHOSPHATE, MONOBASIC 7; 19 G/133ML; G/133ML
1 ENEMA RECTAL ONCE AS NEEDED
Status: DISCONTINUED | OUTPATIENT
Start: 2020-05-25 | End: 2020-05-29

## 2020-05-25 RX ORDER — BISACODYL 10 MG
10 SUPPOSITORY, RECTAL RECTAL
Status: DISCONTINUED | OUTPATIENT
Start: 2020-05-25 | End: 2020-05-29

## 2020-05-25 RX ORDER — ONDANSETRON 2 MG/ML
4 INJECTION INTRAMUSCULAR; INTRAVENOUS EVERY 4 HOURS PRN
Status: DISCONTINUED | OUTPATIENT
Start: 2020-05-25 | End: 2020-05-25

## 2020-05-25 RX ORDER — MORPHINE SULFATE 4 MG/ML
2 INJECTION, SOLUTION INTRAMUSCULAR; INTRAVENOUS ONCE
Status: COMPLETED | OUTPATIENT
Start: 2020-05-25 | End: 2020-05-25

## 2020-05-25 RX ORDER — NALOXONE HYDROCHLORIDE 0.4 MG/ML
0.08 INJECTION, SOLUTION INTRAMUSCULAR; INTRAVENOUS; SUBCUTANEOUS
Status: DISCONTINUED | OUTPATIENT
Start: 2020-05-25 | End: 2020-05-29

## 2020-05-25 RX ORDER — MORPHINE SULFATE 2 MG/ML
1 INJECTION, SOLUTION INTRAMUSCULAR; INTRAVENOUS EVERY 2 HOUR PRN
Status: DISCONTINUED | OUTPATIENT
Start: 2020-05-25 | End: 2020-05-29

## 2020-05-25 RX ORDER — SODIUM CHLORIDE 9 MG/ML
INJECTION, SOLUTION INTRAVENOUS CONTINUOUS
Status: ACTIVE | OUTPATIENT
Start: 2020-05-25 | End: 2020-05-25

## 2020-05-25 RX ORDER — ONDANSETRON 4 MG/1
4 TABLET, FILM COATED ORAL EVERY 6 HOURS PRN
Status: DISCONTINUED | OUTPATIENT
Start: 2020-05-25 | End: 2020-05-29

## 2020-05-25 RX ORDER — POLYETHYLENE GLYCOL 3350 17 G/17G
17 POWDER, FOR SOLUTION ORAL DAILY PRN
Status: DISCONTINUED | OUTPATIENT
Start: 2020-05-25 | End: 2020-05-29

## 2020-05-25 RX ORDER — DOCUSATE SODIUM 100 MG/1
100 CAPSULE, LIQUID FILLED ORAL 2 TIMES DAILY
Status: DISCONTINUED | OUTPATIENT
Start: 2020-05-25 | End: 2020-05-29

## 2020-05-25 NOTE — PLAN OF CARE
Problem: Patient/Family Goals  Goal: Patient/Family Long Term Goal  Description  Patient's Long Term Goal: be free from wounds     Interventions:  - continue POC   - See additional Care Plan goals for specific interventions  Outcome: Progressing  Goal: P

## 2020-05-25 NOTE — ED PROVIDER NOTES
Patient Seen in: [facility] AND [facility] Emergency Department    History   Patient presents with:   Infection  Abscess    Stated Complaint: infection     HPI    70-year-old male with past medical history of diabetes, HIV with unknown viral load/CD4 count presenti perirectal pain. Positive for stated complaint: infection  Other systems are as noted in HPI. Constitutional and vital signs reviewed. All other systems reviewed and negative except as noted above.     PSFH elements reviewed from today and agreed e ---------                               -----------         ------                     CBC W/ DIFFERENTIAL[[id number]]          Abnormal            Final result                 Please view results for these tests on the individual orders.    RAINBOW DRAW B call medicine Dr. Judy Feng graciously admitting and surgery on call Dr. Fransisco Lopez consulted for possible debridement.     I was wearing at minimum a facemask and eye protection throughout this encounter with handwashing performed prior and after patient eval

## 2020-05-25 NOTE — CONSULTS
[facility]    Report of Consultation    Triston Jones Patient Status:  Inpatient    1955 MRN [id number]   Location [facility] 2W/SW Attending Kelley Woo MD   Hosp Day # 0 PCP 9[address] [facility]     Date of Admission: (NARCAN) 0.4 MG/ML injection 0.08 mg, 0.08 mg, Intravenous, Q5 Min PRN  docusate sodium (COLACE) cap 100 mg, 100 mg, Oral, BID  PEG 3350 (MIRALAX) powder packet 17 g, 17 g, Oral, Daily PRN  magnesium hydroxide (MILK OF MAGNESIA) 400 MG/5ML suspension 30 mL Location: Right arm)   Pulse 84   Temp 97.6 °F (36.4 °C) (Tympanic)   Resp 12   Wt 165 lb (74.8 kg)   SpO2 94%   BMI 23.68 kg/m²    Body mass index is 23.68 kg/m². I/O last 3 completed shifts:   In: 1600 [I.V.:1000; IV PIGGYBACK:600]  Out: 450 [Urine:450 TP 7.9 05/05/2020    AST 33 05/05/2020    ALT 16 05/05/2020    DDIMER 3.93 (H) 05/09/2020    CRP 1.27 (H) 05/09/2020    TROP <0.045 05/05/2020         Imaging:  Ct Pelvis(contrast Only) (cpt=72193)    Result Date: 5/25/2020  CONCLUSION:  1.  Nonspecific ano

## 2020-05-25 NOTE — PROGRESS NOTES
Skin note done with Cony Fernández RN. Perianal abscess noted, open to air pink and excoriated with purulent drainage. Rest of skin intact.

## 2020-05-25 NOTE — PROGRESS NOTES
[facility]    Initial Pharmacokinetic Consult for Vancomycin Dosing   Vancomycin 2gm ivpb x 1 er     Johnnie NewellD  5/24/2020  11:42 PM  [address] [place]: [phone]

## 2020-05-25 NOTE — ED INITIAL ASSESSMENT (HPI)
Seen here Friday for perianal abscess and states it is spreading. Continues to have severe pain. 10/10 pain. Patient having discharge. Denies fevers    Patient was admitted for COVID, discharged 10 days ago.

## 2020-05-25 NOTE — CONSULTS
[facility] [facility] - [facility] ID CONSULT NOTE    Belkyslorelei Adina Patient Status:  Inpatient    1955 MRN [id number]   Location [facility] 2W/SW Attending Jason Swift MD   Hosp Day # 0 PCP 52[facility]       Reason for Consult pressure or chest discomfort  RESPIRATORY:  No shortness of breath, cough or sputum. GASTROINTESTINAL:  No anorexia, nausea, vomiting or diarrhea. No abdominal pain or blood. GENITOURINARY:  No Burning on urination.    NEUROLOGICAL:  No headache, dizzines episodes of diarrhea yesterday. On arrival, afebrile, wbc 11.1, Cr 1.37, CT pelvis without abscess, drainage cx obtained, started on vancomycin and zosyn.  ID consulted.      # Perirectal abscess with purulent drainage, drainage cx pending, CT A/P without p

## 2020-05-25 NOTE — PROGRESS NOTES
120 [facility] Dosing Service    Initial Pharmacokinetic Consult for Vancomycin Dosing     Virgilio Rasmussen is a 62year old male who is being treated for cellulitis. Pharmacy has been asked to dose Vancomycin by CITLALLI Garrido     He has No Known Allergies.

## 2020-05-25 NOTE — PROGRESS NOTES
Admitted to PCCU  A&ox4, some pain to rectal area but controlled at this time with morphine from ED  RA, NSR/ST on tele, VSS  Up to bathroom with SBA, changed his own brief d/t pus from abscess  Per pt abscess extends from top of gluteal cleft to rectum

## 2020-05-26 PROCEDURE — 80048 BASIC METABOLIC PNL TOTAL CA: CPT | Performed by: INTERNAL MEDICINE

## 2020-05-26 PROCEDURE — 82962 GLUCOSE BLOOD TEST: CPT

## 2020-05-26 RX ORDER — DIPHENHYDRAMINE HCL 25 MG
25 CAPSULE ORAL EVERY 6 HOURS PRN
Status: DISCONTINUED | OUTPATIENT
Start: 2020-05-26 | End: 2020-05-29

## 2020-05-26 RX ORDER — ENALAPRIL MALEATE 10 MG/1
10 TABLET ORAL DAILY
Status: DISCONTINUED | OUTPATIENT
Start: 2020-05-26 | End: 2020-05-29

## 2020-05-26 RX ORDER — FENOFIBRATE 134 MG/1
134 CAPSULE ORAL
Status: DISCONTINUED | OUTPATIENT
Start: 2020-05-27 | End: 2020-05-29

## 2020-05-26 RX ORDER — BENZONATATE 100 MG/1
200 CAPSULE ORAL 3 TIMES DAILY PRN
Status: DISCONTINUED | OUTPATIENT
Start: 2020-05-26 | End: 2020-05-29

## 2020-05-26 RX ORDER — ASPIRIN 81 MG/1
81 TABLET, CHEWABLE ORAL DAILY
Status: DISCONTINUED | OUTPATIENT
Start: 2020-05-26 | End: 2020-05-29

## 2020-05-26 RX ORDER — SULFAMETHOXAZOLE AND TRIMETHOPRIM 800; 160 MG/1; MG/1
1 TABLET ORAL DAILY
Status: DISCONTINUED | OUTPATIENT
Start: 2020-05-26 | End: 2020-05-29

## 2020-05-26 NOTE — PLAN OF CARE
VSS, pt on room air with O2 sats in upper 90s. Respirations even and unlabored, but strong nonproductive cough noted. Pt has done sitz bathes independently this shift, states they are effective in reducing pain.  Also receiving Toradol and MS prn for pain c Instruct patient on self management of diabetes  Outcome: Progressing

## 2020-05-26 NOTE — H&P
[facility]    PATIENT'S NAME: Delfino Lat PHYSICIAN: Garland Cali MD   PATIENT ACCOUNT#:   [id number]    LOCATION:  [address] [place] [address] RECORD #:   [id number]       YOB: 1955  ADMISSION DATE:       05/24/ lymphadenopathy. LUNGS:  Clear to auscultation. HEART:  S1, S2 heard. No S3, no S4. No murmur. No rub. No gallop. ABDOMEN:  Soft, nontender, nondistended. No hepatosplenomegaly. EXTREMITIES:  No edema. NEUROLOGIC:  Alert and oriented x3.   No defi 13:22:14  [facility] [id number]  [place]/

## 2020-05-26 NOTE — WOUND PROGRESS NOTE
RN placed wound consult for perianal abscess. MD has ordered sitz bath TID and PRN. Continue with MD orders.

## 2020-05-26 NOTE — PROGRESS NOTES
[facility]    Progress Note    Triston Jones Patient Status:  Inpatient    1955 MRN [id number]   Location [address] [facility] Attending Kelley Woo MD   Hosp Day # 1 PCP JAELYN VALLES        Subjective:   Triston Jones i Daily PRN  magnesium hydroxide (MILK OF MAGNESIA) 400 MG/5ML suspension 30 mL, 30 mL, Oral, Daily PRN  bisacodyl (DULCOLAX) rectal suppository 10 mg, 10 mg, Rectal, Daily PRN  Fleet Enema (FLEET) 7-19 GM/118ML enema 133 mL, 1 enema, Rectal, Once PRN  Senna membranes. EOM-I. PERRL  Neck: No lymphadenopathy. No JVD. No carotid bruits. Respiratory: Clear to auscultation bilaterally. No wheezes. No rhonchi. Cardiovascular: S1, S2.  Regular rate and rhythm. No murmurs.  Equal pulses   Abdomen: Soft, nontender distinct drainable perirectal fluid collections identified. If there is persistent clinical concern, follow-up MRI of the pelvis with and without contrast (perianal fistula protocol) may be considered. 3. Left external iliac chain lymphadenopathy.   4. Unc

## 2020-05-26 NOTE — PLAN OF CARE
VSS. Pain management with scheduled toradol and PRN morphine. Patient reports mild relief with sitz baths. IV abx continued. Sars-Cov-2 PCR pending. Plan for wound consult. Patient resting comfortably with call light in reach.  Frequent rounding, will melissa

## 2020-05-27 PROCEDURE — 82550 ASSAY OF CK (CPK): CPT | Performed by: INTERNAL MEDICINE

## 2020-05-27 PROCEDURE — 84484 ASSAY OF TROPONIN QUANT: CPT | Performed by: INTERNAL MEDICINE

## 2020-05-27 PROCEDURE — 82962 GLUCOSE BLOOD TEST: CPT

## 2020-05-27 PROCEDURE — 83880 ASSAY OF NATRIURETIC PEPTIDE: CPT | Performed by: INTERNAL MEDICINE

## 2020-05-27 PROCEDURE — 80202 ASSAY OF VANCOMYCIN: CPT | Performed by: PHYSICIAN ASSISTANT

## 2020-05-27 PROCEDURE — 84145 PROCALCITONIN (PCT): CPT | Performed by: INTERNAL MEDICINE

## 2020-05-27 PROCEDURE — 87040 BLOOD CULTURE FOR BACTERIA: CPT | Performed by: INTERNAL MEDICINE

## 2020-05-27 RX ORDER — VALACYCLOVIR HYDROCHLORIDE 500 MG/1
1000 TABLET, FILM COATED ORAL EVERY 12 HOURS SCHEDULED
Status: DISCONTINUED | OUTPATIENT
Start: 2020-05-27 | End: 2020-05-29

## 2020-05-27 RX ORDER — HEPARIN SODIUM 5000 [USP'U]/ML
5000 INJECTION, SOLUTION INTRAVENOUS; SUBCUTANEOUS EVERY 8 HOURS SCHEDULED
Status: DISCONTINUED | OUTPATIENT
Start: 2020-05-27 | End: 2020-05-29

## 2020-05-27 NOTE — PROGRESS NOTES
[place] [facility] - [facility] PROGRESS NOTE    Kathy Hernández Patient Status:  Inpatient    1955 MRN [id number]   Location [facility]5W Attending Blayne Mcconnell MD   Hosp Day # 2 PCP JAELYN VALLES     Subjective:  Patient st A/P without perirectal fluid collections               -s/p local I&D in ED on 5/21, on bactrim PTA               -General surgery following---continued observation  # Leukocytosis  # Diarrhea  # Severe COVID-19 pneumonia with hypoxia, bilateral infiltrate

## 2020-05-27 NOTE — PROGRESS NOTES
[facility]    Progress Note    Triston Jones Patient Status:  Inpatient    1955 MRN [id number]   Location [address] [facility] Attending Kelley Woo MD   Hosp Day # 2 PCP 3190 [facility]        Subjective:   Triston Jones i MG/ML injection 0.08 mg, 0.08 mg, Intravenous, Q5 Min PRN  docusate sodium (COLACE) cap 100 mg, 100 mg, Oral, BID  PEG 3350 (MIRALAX) powder packet 17 g, 17 g, Oral, Daily PRN  magnesium hydroxide (MILK OF MAGNESIA) 400 MG/5ML suspension 30 mL, 30 mL, Oral and oriented x 3. HEENT: Moist mucous membranes. EOM-I. PERRL  Neck: No lymphadenopathy. No JVD. No carotid bruits. Respiratory: Clear to auscultation bilaterally. No wheezes. No rhonchi. Cardiovascular: S1, S2.  Regular rate and rhythm. No murmurs. 05/09/2020    TROP <0.045 05/25/2020                          Maria Antonia Gunn MD  5/27/2020

## 2020-05-27 NOTE — PROGRESS NOTES
120 [facility] dosing service    Follow-up Pharmacokinetic Consult for Vancomycin Dosing     Virgilio Rasmussen is a 62year old male who is being treated for cellulitis. Patient is on day 3 of Vancomycin and is currently receiving 1 gm IV Q 12 hours.   Goal tro

## 2020-05-27 NOTE — PLAN OF CARE
AOX4, denies SOB. Continued pain 8/10 in perirectal area, small amount of yellow/tan drainage noted. Sitz baths done independently by patient. Patient complaining of persistent, nonproductive cough and bilateral inner ear pruritus.  No redness or drainage n diabetes  Outcome: Progressing     Problem: SKIN/TISSUE INTEGRITY - ADULT  Goal: Skin integrity remains intact  Description  INTERVENTIONS  - Assess and document risk factors for pressure ulcer development  - Assess and document skin integrity  - Monitor f

## 2020-05-27 NOTE — PLAN OF CARE
Problem: Patient/Family Goals  Goal: Patient/Family Long Term Goal  Description  Patient's Long Term Goal: Avoid rehospitalization. Interventions:  - Take medications as prescribed. - Follow up with PCP/specialties outpatient as needed.  Follow disc wounds(s) or drain site(s) healing without S/S of infection  Description  INTERVENTIONS:  - Assess and document risk factors for pressure ulcer development  - Assess and document skin integrity  - Assess and document dressing/incision, wound bed, drain sit

## 2020-05-27 NOTE — PROGRESS NOTES
[facility]    Progress Note    Jase Winston Patient Status:  Inpatient    1955 MRN [id number]   Location [address] [facility] Attending Yasmany Allen MD   Hosp Day # 1 PCP JAELYN VALLES       Subjective:   Jase Owusus is flextouch 12 Units, 12 Units, Subcutaneous, Nightly  •  [START ON 5/27/2020] metFORMIN HCl (GLUCOPHAGE) tab 2,000 mg, 2,000 mg, Oral, Daily with breakfast  •  benzonatate (TESSALON) cap 200 mg, 200 mg, Oral, TID PRN  •  diphenhydrAMINE (BENADRYL) cap/tab 2 Aspart Pen (NOVOLOG) 100 UNIT/ML flexpen 1-5 Units, 1-5 Units, Subcutaneous, TID CC    Allergies  No Known Allergies    Assessment and Plan:     Perirectal cellulitis        Failure of outpatient treatment        HIV infection, unspecified symptom status ( 05/26/2020     (H) 05/26/2020    CA 7.9 (L) 05/26/2020    ALB 2.6 (L) 05/05/2020    ALKPHO 92 05/05/2020    BILT 1.4 05/05/2020    TP 7.9 05/05/2020    AST 33 05/05/2020    ALT 16 05/05/2020    DDIMER 3.93 (H) 05/09/2020    CRP 1.27 (H) 05/09/2020 Patient will require inpatient services that will reasonably be expected to span two midnight's based on the clinical documentation in H+P. Based on patients current state of illness, I anticipate that, after discharge, patient will require TBD.

## 2020-05-28 PROCEDURE — 86140 C-REACTIVE PROTEIN: CPT | Performed by: INTERNAL MEDICINE

## 2020-05-28 PROCEDURE — 84484 ASSAY OF TROPONIN QUANT: CPT | Performed by: INTERNAL MEDICINE

## 2020-05-28 PROCEDURE — 82962 GLUCOSE BLOOD TEST: CPT

## 2020-05-28 PROCEDURE — 82728 ASSAY OF FERRITIN: CPT | Performed by: INTERNAL MEDICINE

## 2020-05-28 PROCEDURE — 85025 COMPLETE CBC W/AUTO DIFF WBC: CPT | Performed by: INTERNAL MEDICINE

## 2020-05-28 PROCEDURE — 85379 FIBRIN DEGRADATION QUANT: CPT | Performed by: INTERNAL MEDICINE

## 2020-05-28 PROCEDURE — 81003 URINALYSIS AUTO W/O SCOPE: CPT | Performed by: INTERNAL MEDICINE

## 2020-05-28 PROCEDURE — 80053 COMPREHEN METABOLIC PANEL: CPT | Performed by: INTERNAL MEDICINE

## 2020-05-28 PROCEDURE — 93005 ELECTROCARDIOGRAM TRACING: CPT

## 2020-05-28 PROCEDURE — 93010 ELECTROCARDIOGRAM REPORT: CPT | Performed by: INTERNAL MEDICINE

## 2020-05-28 PROCEDURE — 83615 LACTATE (LD) (LDH) ENZYME: CPT | Performed by: INTERNAL MEDICINE

## 2020-05-28 NOTE — PROGRESS NOTES
[facility]    Progress Note    Triston Jones Patient Status:  Inpatient    1955 MRN [id number]   Location [facility] Attending Kelley Woo MD   Hosp Day # 2 PCP JAELYN VALLES       Subjective:   Triston Jones is tablet, Oral, Daily  •  Enalapril Maleate (VASOTEC) tab 10 mg, 10 mg, Oral, Daily  •  fenofibrate micronized (LOFIBRA) cap 134 mg, 134 mg, Oral, Daily with breakfast  •  insulin detemir (LEVEMIR) 100 UNIT/ML flextouch 12 Units, 12 Units, Subcutaneous, [facility] (DEX-4) chewable tab 8 tablet, 8 tablet, Oral, Q15 Min PRN  •  Insulin Aspart Pen (NOVOLOG) 100 UNIT/ML flexpen 1-5 Units, 1-5 Units, Subcutaneous, TID CC    Allergies  No Known Allergies    Assessment and Plan:         Perirectal cellulitis          Failu Results   Component Value Date    WBC 11.1 (H) 05/24/2020    HGB 10.4 (L) 05/24/2020    HCT 29.9 (L) 05/24/2020    .0 05/24/2020     05/26/2020    K 4.5 05/26/2020     (H) 05/26/2020    CO2 25.0 05/26/2020    CREATSERUM 0.99 05/26/2020

## 2020-05-28 NOTE — PLAN OF CARE
Patient resting in bed. Alert & orientedx4. On room air. Continuing pain meds and IV antibiotics. Continuing sitz baths. Fall precautions maintained. Call light in reach. Isolation maintained for + COVID.    Problem: Patient/Family Goals  Goal: Patient/Fami Assess and document skin integrity  - Monitor for areas of redness and/or skin breakdown  - Initiate interventions, skin care algorithm/standards of care as needed  Outcome: Progressing  Goal: Incision(s), wounds(s) or drain site(s) healing without S/S of

## 2020-05-28 NOTE — PLAN OF CARE
VSS. Pain management with scheduled toradol and PRN morphine. Home medications for HIV treatment discussed with pharmacy and placed in patient room. Sitz baths performed by patient independently. IV abx continued.  Discussed additional orders with Dr. Erika Avalos Problem: SKIN/TISSUE INTEGRITY - ADULT  Goal: Skin integrity remains intact  Description  INTERVENTIONS  - Assess and document risk factors for pressure ulcer development  - Assess and document skin integrity  - Monitor for areas of redness and/or skin b

## 2020-05-28 NOTE — DIETARY NOTE
ADULT NUTRITION INITIAL ASSESSMENT    Pt is at moderate nutrition risk. Pt does not meet malnutrition criteria. Pt on isolation for +COVID19 therefore unable to perform nutrition focused physical assessment d/t limited contact restrictions.     Leander Dill index is 23.86 kg/m². BMI CLASSIFICATION: 19-24.9 kg/m2 - WNL  IBW: 166 lbs        100% IBW  Usual Body Wt: 70-75 lbs       95-98% UBW    WEIGHT HISTORY: Current wt reflects 2-5% wt loss over the past ~1 month from UBW.   Patient Weight(s) for the past 336 Pen  1-5 Units Subcutaneous TID CC   PRN MEDICATIONS: Lidocaine HCl, sodium chloride 0.9%, benzonatate, diphenhydrAMINE, Naloxone HCl, PEG 3350, magnesium hydroxide, bisacodyl, Fleet Enema, ondansetron HCl **OR** Ondansetron HCl **OR** ondansetron, morphIN

## 2020-05-29 VITALS
SYSTOLIC BLOOD PRESSURE: 138 MMHG | BODY MASS INDEX: 23.39 KG/M2 | HEART RATE: 92 BPM | DIASTOLIC BLOOD PRESSURE: 72 MMHG | OXYGEN SATURATION: 98 % | WEIGHT: 163.38 LBS | TEMPERATURE: 98 F | HEIGHT: 70 IN | RESPIRATION RATE: 18 BRPM

## 2020-05-29 PROCEDURE — 85379 FIBRIN DEGRADATION QUANT: CPT | Performed by: INTERNAL MEDICINE

## 2020-05-29 PROCEDURE — 85025 COMPLETE CBC W/AUTO DIFF WBC: CPT | Performed by: INTERNAL MEDICINE

## 2020-05-29 PROCEDURE — 82962 GLUCOSE BLOOD TEST: CPT

## 2020-05-29 PROCEDURE — 83615 LACTATE (LD) (LDH) ENZYME: CPT | Performed by: INTERNAL MEDICINE

## 2020-05-29 PROCEDURE — 82728 ASSAY OF FERRITIN: CPT | Performed by: INTERNAL MEDICINE

## 2020-05-29 PROCEDURE — 86140 C-REACTIVE PROTEIN: CPT | Performed by: INTERNAL MEDICINE

## 2020-05-29 RX ORDER — BENZONATATE 200 MG/1
200 CAPSULE ORAL 3 TIMES DAILY PRN
Qty: 30 CAPSULE | Refills: 0 | Status: SHIPPED
Start: 2020-05-29

## 2020-05-29 RX ORDER — VALACYCLOVIR HYDROCHLORIDE 1 G/1
1000 TABLET, FILM COATED ORAL EVERY 12 HOURS SCHEDULED
Qty: 14 TABLET | Refills: 0 | Status: SHIPPED | OUTPATIENT
Start: 2020-05-29 | End: 2020-06-05

## 2020-05-29 RX ORDER — ONDANSETRON 4 MG/1
4 TABLET, FILM COATED ORAL EVERY 6 HOURS PRN
Qty: 10 TABLET | Refills: 0 | Status: SHIPPED | OUTPATIENT
Start: 2020-05-29

## 2020-05-29 RX ORDER — BISACODYL 10 MG
10 SUPPOSITORY, RECTAL RECTAL
Qty: 10 SUPPOSITORY | Refills: 0 | Status: SHIPPED | OUTPATIENT
Start: 2020-05-29 | End: 2020-06-08

## 2020-05-29 RX ORDER — SENNA AND DOCUSATE SODIUM 50; 8.6 MG/1; MG/1
1 TABLET, FILM COATED ORAL DAILY
Qty: 20 TABLET | Refills: 0 | Status: SHIPPED | OUTPATIENT
Start: 2020-05-30

## 2020-05-29 RX ORDER — AMOXICILLIN AND CLAVULANATE POTASSIUM 875; 125 MG/1; MG/1
1 TABLET, FILM COATED ORAL 2 TIMES DAILY
Qty: 14 TABLET | Refills: 0 | Status: SHIPPED | OUTPATIENT
Start: 2020-05-29 | End: 2020-06-05

## 2020-05-29 NOTE — PLAN OF CARE
Problem: Patient/Family Goals  Goal: Patient/Family Long Term Goal  Description  Patient's Long Term Goal: Avoid rehospitalization. Interventions:  - Take medications as prescribed. - Follow up with PCP/specialties outpatient as needed.  Follow disc Discharge  5/29/2020 1623 by Sofia Orozco  Outcome: Progressing     Problem: SKIN/TISSUE INTEGRITY - ADULT  Goal: Skin integrity remains intact  Description  INTERVENTIONS  - Assess and document risk factors for pressure ulcer development  - Assess

## 2020-05-29 NOTE — PROGRESS NOTES
[place] [facility] - [facility] ID PROGRESS NOTE    Rao Serrano Patient Status:  Inpatient    1955 MRN [id number]   Location [facility]5W Attending Na Henley MD   Hosp Day # 4 PCP JAELYN VALLES     Subjective:  Patient st Perirectal abscess with purulent drainage, drainage cx pending, CT A/P without perirectal fluid collections ?herpetic               -s/p local I&D in ED on 5/21, on bactrim PTA               -General surgery following---continued observation  # Leukocytosi

## 2020-05-29 NOTE — PROGRESS NOTES
Patient's IV and tele box removed, belongings gathered and patient's medications returned.    Discharge instructions discussed including covid precautions, sitz baths at home, new medications (prescriptions given), purpose and side effects, follow up appoin

## 2020-05-29 NOTE — PROGRESS NOTES
[facility]    Progress Note    Patrick Lopez Patient Status:  Inpatient    1955 MRN [id number]   Location [facility]5W Attending Nelsy Perez MD   Hosp Day # 3 PCP JAELYN VALLES        Subjective:   Patrick Lopez i injection 2 mg, 2 mg, Intravenous, Once  Piperacillin Sod-Tazobactam So (ZOSYN) 3.375 g in dextrose 5 % 100 mL ADD-vantage, 3.375 g, Intravenous, Q8H  Ketorolac Tromethamine (TORADOL) injection 15 mg, 15 mg, Intravenous, Q6H  Naloxone HCl (NARCAN) 0.4 MG/M pulse 84, temperature 98.3 °F (36.8 °C), temperature source Oral, resp. rate 16, height 5' 10\" (1.778 m), weight 166 lb 4.8 oz (75.4 kg), SpO2 96 %. I/O last 3 completed shifts: In: 2237.5 [P.O.:2020;  I.V.:30; IV PIGGYBACK:187.5]  Out: 725 [Urine:725 05/28/2020     (H) 05/28/2020    CO2 22.0 05/28/2020     (H) 05/28/2020    CA 7.8 (L) 05/28/2020    ALB 2.5 (L) 05/28/2020    ALKPHO 85 05/28/2020    BILT 0.3 05/28/2020    TP 6.0 (L) 05/28/2020    AST 17 05/28/2020    ALT 21 05/28/2020    DDI

## 2020-05-29 NOTE — PLAN OF CARE
VSS. RA. Pain managed with scheduled toradol thus far. Sitz bath performed by patient independently. IV abx and PO Valtrex continued. Patient resting comfortably with call light in reach. Will continue to monitor.    Problem: Patient/Family Goals  Goal: Rigo Conrad development  - Assess and document skin integrity  - Monitor for areas of redness and/or skin breakdown  - Initiate interventions, skin care algorithm/standards of care as needed  Outcome: Progressing  Goal: Incision(s), wounds(s) or drain site(s) healing

## 2020-05-29 NOTE — PLAN OF CARE
Patient alert and oriented, on room air with no shortness of breath. Sitz baths  TID done independently by patient. Patient cleared by ID to go home on oral abx. Blood sugar checked ACHS, low dose novolog sliding scale for coverage. Patient up ad artur.  Sc self management of diabetes  Outcome: Progressing     Problem: SKIN/TISSUE INTEGRITY - ADULT  Goal: Skin integrity remains intact  Description  INTERVENTIONS  - Assess and document risk factors for pressure ulcer development  - Assess and document skin int

## 2020-05-29 NOTE — PROGRESS NOTES
[facility]    Progress Note    Irving Sullivan Patient Status:  Inpatient    1955 MRN [id number]   Location [address] [facility] Attending Cristofer Onofre MD   Hosp Day # 3 PCP JAELYN VALLES       Subjective:   Irving Sullivan is Daily  •  Emtricitabine-Tenofovir -25 MG TABS 1 tablet, 1 tablet, Oral, Daily  •  Enalapril Maleate (VASOTEC) tab 10 mg, 10 mg, Oral, Daily  •  fenofibrate micronized (LOFIBRA) cap 134 mg, 134 mg, Oral, Daily with breakfast  •  insulin detemir (LEVEM oral liquid 30 g, 30 g, Oral, Q15 Min PRN **OR** Glucose-Vitamin C (DEX-4) chewable tab 8 tablet, 8 tablet, Oral, Q15 Min PRN  •  Insulin Aspart Pen (NOVOLOG) 100 UNIT/ML flexpen 1-5 Units, 1-5 Units, Subcutaneous, TID CC    Allergies  No Known Allergies and zosyn   Treat with lidocaine jelly prn.    05/28/20  Patient reports much better pain control and getting treated for perirectal herpes. Continue supportive care. Abx IV to continue.     Results:     Lab Results   Component Value Date    WBC 5.9 05/28/

## 2020-05-30 ENCOUNTER — TELEPHONE (OUTPATIENT)
Dept: MEDSURG UNIT | Facility: HOSPITAL | Age: 65
End: 2020-05-30

## 2020-05-31 ENCOUNTER — TELEPHONE (OUTPATIENT)
Dept: MEDSURG UNIT | Facility: HOSPITAL | Age: 65
End: 2020-05-31
